# Patient Record
Sex: FEMALE | Race: WHITE | URBAN - METROPOLITAN AREA
[De-identification: names, ages, dates, MRNs, and addresses within clinical notes are randomized per-mention and may not be internally consistent; named-entity substitution may affect disease eponyms.]

---

## 2017-03-28 ENCOUNTER — OP HISTORICAL/CONVERTED ENCOUNTER (OUTPATIENT)
Dept: OTHER | Age: 57
End: 2017-03-28

## 2018-07-07 LAB
CREATININE, EXTERNAL: 1.58
LDL-C, EXTERNAL: 99

## 2018-07-20 ENCOUNTER — OP HISTORICAL/CONVERTED ENCOUNTER (OUTPATIENT)
Dept: OTHER | Age: 58
End: 2018-07-20

## 2018-08-28 ENCOUNTER — OP HISTORICAL/CONVERTED ENCOUNTER (OUTPATIENT)
Dept: OTHER | Age: 58
End: 2018-08-28

## 2018-10-15 ENCOUNTER — OFFICE VISIT (OUTPATIENT)
Dept: ENDOCRINOLOGY | Age: 58
End: 2018-10-15

## 2018-10-15 VITALS
WEIGHT: 128 LBS | DIASTOLIC BLOOD PRESSURE: 71 MMHG | TEMPERATURE: 98 F | HEIGHT: 62 IN | SYSTOLIC BLOOD PRESSURE: 169 MMHG | HEART RATE: 64 BPM | OXYGEN SATURATION: 100 % | RESPIRATION RATE: 14 BRPM | BODY MASS INDEX: 23.55 KG/M2

## 2018-10-15 DIAGNOSIS — E89.0 POSTABLATIVE HYPOTHYROIDISM: Primary | ICD-10-CM

## 2018-10-15 RX ORDER — TAMOXIFEN CITRATE 20 MG/1
20 TABLET ORAL DAILY
COMMUNITY
Start: 2018-09-09

## 2018-10-15 RX ORDER — ARIPIPRAZOLE 5 MG/1
5 TABLET ORAL 2 TIMES DAILY
COMMUNITY
Start: 2018-04-01

## 2018-10-15 RX ORDER — FUROSEMIDE 40 MG/1
40 TABLET ORAL DAILY
COMMUNITY
Start: 2018-09-10

## 2018-10-15 RX ORDER — ATORVASTATIN CALCIUM 40 MG/1
40 TABLET, FILM COATED ORAL DAILY
COMMUNITY
Start: 2018-04-01

## 2018-10-15 RX ORDER — CLOPIDOGREL BISULFATE 75 MG/1
TABLET ORAL
COMMUNITY

## 2018-10-15 RX ORDER — POTASSIUM CHLORIDE 750 MG/1
10 TABLET, EXTENDED RELEASE ORAL 2 TIMES DAILY
COMMUNITY
Start: 2018-04-01

## 2018-10-15 RX ORDER — GUAIFENESIN 100 MG/5ML
81 LIQUID (ML) ORAL DAILY
COMMUNITY
Start: 2018-04-01

## 2018-10-15 RX ORDER — LEVOTHYROXINE SODIUM 200 UG/1
200 TABLET ORAL
COMMUNITY
Start: 2018-09-10

## 2018-10-15 NOTE — PROGRESS NOTES
[facility]               Jeanette Schumacher MD        [address] 78 [phone] Fax [phone]             Patient Information  Date:10/15/2018  Name : Santino Melendez 62 y.o.     YOB: 1960         Referred by: Chavo Hernandez MD         History of present illness    Santino Melendez is a 62 y.o. female  here for evaluation of thyroid and fluctuating thyroid function test  She is a status post radioactive iodine ablation 20 years ago and is on replacement levothyroxine. TSH has been fluctuating, reports compliance  He is on levothyroxine generic, was on Synthroid in the past  Due to the expense does not want to go on brand name  She is also taking the medication with juice, reportedly had a lot of medical issues this year including heart attack, colon surgery which is overwhelming her  No gastric bypass  She is on levothyroxine 200 mcg, TSH in January was 99 and a week later it was normal, in July 2018 TSH was 19   She is not on biotin        No FH of thyroid disease. No FH of thyroid cancer     Wt Readings from Last 3 Encounters:   10/15/18 128 lb (58.1 kg)       Past Medical History:   Diagnosis Date    Arthritis     Breast cancer ([facility] [address] 2014    Bronchitis     CHF (congestive heart failure) (HCC)     Depression     Graves disease     Heart attack ([facility] 03/2018    High blood pressure     Hyperlipidemia        Current Outpatient Prescriptions   Medication Sig    atorvastatin (LIPITOR) 40 mg tablet Take 40 mg by mouth daily.  potassium chloride (KLOR-CON) 10 mEq tablet Take 10 mEq by mouth two (2) times a day.  ARIPiprazole (ABILIFY) 5 mg tablet Take 5 mg by mouth two (2) times a day.  tamoxifen (NOLVADEX) 20 mg tablet Take 20 mg by mouth daily.  furosemide (LASIX) 40 mg tablet Take 40 mg by mouth daily.  levothyroxine (SYNTHROID) 200 mcg tablet Take 200 mcg by mouth Daily (before breakfast).     aspirin 81 mg chewable tablet Take 81 mg by mouth daily.  clopidogrel (PLAVIX) 75 mg tab Take  by mouth. No current facility-administered medications for this visit. No Known Allergies      Review of Systems:  All 10 systems  reviewed and are negative other than mentioned in HPI    Physical Examination:  Blood pressure 169/71, pulse 64, temperature 98 °F (36.7 °C), temperature source Oral, resp. rate 14, height 5' 2\" (1.575 m), weight 128 lb (58.1 kg), SpO2 100 %. - General: pleasant, no distress, good eye contact  - HEENT: no exopthalmos, no periorbital edema, no scleral/conjunctival injection, EOMI, no lid lag or stare  - Neck: supple, no thyromegaly, no nodules,no lymphadenopathy  - Cardiovascular: regular, normal rate, normal S1 and S2,  - Respiratory: clear to auscultation bilaterally  - Gastrointestinal: soft, nontender, nondistended, BS +  - Musculoskeletal: no proximal muscle weakness in upper or lower extremities  - Integumentary: no tremors, no edema,  - Neurological:alert and oriented   - Psychiatric: normal mood and affect    Data Reviewed:     [] Reviewed labs      Assessment/Plan:     1. Postablative hypothyroidism        Post ablative hypothyroidism   Discussed the natural course of hypothyroidism and instructions for levothyroxine,compliance to keep the levels stable. Discussed about the various factors which can affect TSH including medications,weight change,illness, compliance and instructions to take LT4 by itself to keep the levels stable. Fluctuating TSH, she is on a higher dose, reports compliance  Malabsorption versus TSH assay interference  Due to the expense she did not want to change to brand Synthroid. She wants to take it consistently according to the instructions with water only and monitor the blood test in 6 weeks, if abnormal then agreed to switch to brand name. Also overwhelmed with multiple doctors visits  she did not want to come here for the labs, wants to get it at PCPs office.   Explained the risks of uncontrolled hypothyroidism                  There are no Patient Instructions on file for this visit. Follow-up Disposition:  Return if symptoms worsen or fail to improve. Patient /caregiver verbalized understanding   Voice-recognition software was used to generate this report, which may result in some phonetic-based errors in the grammar and contents. Even though attempts were made to correct all the mistakes, some may have been missed and remained in the body of the report.

## 2018-10-15 NOTE — MR AVS SNAPSHOT
[facility] [address] 
[phone] Patient: Sol Mac MRN: [id number] ERT:2/40/[birth year]805 Visit Information Date & Time Provider Department Dept. Phone Encounter #  
 10/15/2018 11:00 AM Magno Yuan MD [facility] [place] [facility] [phone] [id number] Follow-up Instructions Return if symptoms worsen or fail to improve. Upcoming Health Maintenance Date Due Hepatitis C Screening 1960 PAP AKA CERVICAL CYTOLOGY 7/22/1981 Shingrix Vaccine Age 50> (1 of 2) 7/22/2010 BREAST CANCER SCRN MAMMOGRAM 7/22/2010 FOBT Q 1 YEAR AGE 50-75 7/22/2010 MEDICARE YEARLY EXAM 3/14/2018 DTaP/Tdap/Td series (2 - Td) 10/1/2027 Allergies as of 10/15/2018  Review Complete On: 10/15/2018 By: Magno Yuan MD  
 No Known Allergies Current Immunizations  Never Reviewed Name Date Influenza Vaccine 9/29/2018 Not reviewed this visit You Were Diagnosed With   
  
 Codes Comments Postablative hypothyroidism    -  Primary ICD-10-CM: E89.0 ICD-9-CM: 244.1 Vitals BP Pulse Temp Resp Height(growth percentile) Weight(growth percentile) 169/71 (BP 1 Location: Right arm, BP Patient Position: Sitting) 64 98 °F (36.7 °C) (Oral) 14 5' 2\" (1.575 m) 128 lb (58.1 kg) SpO2 BMI OB Status Smoking Status 100% 23.41 kg/m2 Hysterectomy Former Smoker Vitals History BMI and BSA Data Body Mass Index Body Surface Area  
 23.41 kg/m 2 1.59 m 2 Preferred Pharmacy Pharmacy Name Phone [facility] [place] [address] [phone] Your Updated Medication List  
  
   
This list is accurate as of 10/15/18 11:20 AM.  Always use your most recent med list.  
  
  
  
  
 ARIPiprazole 5 mg tablet Commonly known as:  ABILIFY Take 5 mg by mouth two (2) times a day. aspirin 81 mg chewable tablet Take 81 mg by mouth daily. atorvastatin 40 mg tablet Commonly known as:  LIPITOR Take 40 mg by mouth daily. furosemide 40 mg tablet Commonly known as:  LASIX Take 40 mg by mouth daily. levothyroxine 200 mcg tablet Commonly known as:  SYNTHROID Take 200 mcg by mouth Daily (before breakfast). PLAVIX 75 mg Tab Generic drug:  clopidogrel Take  by mouth.  
  
 potassium chloride 10 mEq tablet Commonly known as:  KLOR-CON Take 10 mEq by mouth two (2) times a day. tamoxifen 20 mg tablet Commonly known as:  NOLVADEX Take 20 mg by mouth daily. Follow-up Instructions Return if symptoms worsen or fail to improve. Introducing [facility]! [employer] introduces [employer] patient portal. Now you can access parts of your medical record, email your doctor's office, and request medication refills online. 1. In your internet browser, go to [email or web address] 2. Click on the First Time User? Click Here link in the Sign In box. You will see the New Member Sign Up page. 3. Enter your [employer] Access Code exactly as it appears below. You will not need to use this code after youve completed the sign-up process. If you do not sign up before the expiration date, you must request a new code. · [employer] Access Code: [email or web address] Expires: 1/13/2019 11:20 AM 
 
4. Enter the last four digits of your Social Security Number (xxxx) and Date of Birth (mm/dd/yyyy) as indicated and click Submit. You will be taken to the next sign-up page. 5. Create a [employer] ID. This will be your [employer] login ID and cannot be changed, so think of one that is secure and easy to remember. 6. Create a [employer] password. You can change your password at any time. 7. Enter your Password Reset Question and Answer. This can be used at a later time if you forget your password. 8. Enter your e-mail address.  You will receive e-mail notification when new information is available in [employer]. 9. Click Sign Up. You can now view and download portions of your medical record. 10. Click the Download Summary menu link to download a portable copy of your medical information. If you have questions, please visit the Frequently Asked Questions section of the [employer] website. Remember, [employer] is NOT to be used for urgent needs. For medical emergencies, dial 911. Now available from your iPhone and Android! Please provide this summary of care documentation to your next provider. Your primary care clinician is listed as Kelly Solorzano. If you have any questions after today's visit, please call [phone].

## 2018-11-06 ENCOUNTER — OP HISTORICAL/CONVERTED ENCOUNTER (OUTPATIENT)
Dept: OTHER | Age: 58
End: 2018-11-06

## 2018-11-17 ENCOUNTER — OP HISTORICAL/CONVERTED ENCOUNTER (OUTPATIENT)
Dept: OTHER | Age: 58
End: 2018-11-17

## 2018-11-26 ENCOUNTER — OP HISTORICAL/CONVERTED ENCOUNTER (OUTPATIENT)
Dept: OTHER | Age: 58
End: 2018-11-26

## 2018-12-11 ENCOUNTER — OP HISTORICAL/CONVERTED ENCOUNTER (OUTPATIENT)
Dept: OTHER | Age: 58
End: 2018-12-11

## 2018-12-17 ENCOUNTER — OP HISTORICAL/CONVERTED ENCOUNTER (OUTPATIENT)
Dept: OTHER | Age: 58
End: 2018-12-17

## 2022-03-19 PROBLEM — E89.0 POSTABLATIVE HYPOTHYROIDISM: Status: ACTIVE | Noted: 2018-10-15

## 2023-05-25 RX ORDER — LEVOTHYROXINE SODIUM 0.2 MG/1
200 TABLET ORAL
COMMUNITY
Start: 2018-09-10

## 2023-05-25 RX ORDER — ARIPIPRAZOLE 5 MG/1
5 TABLET ORAL 2 TIMES DAILY
COMMUNITY
Start: 2018-04-01

## 2023-05-25 RX ORDER — ASPIRIN 81 MG/1
81 TABLET, CHEWABLE ORAL DAILY
COMMUNITY
Start: 2018-04-01

## 2023-05-25 RX ORDER — ATORVASTATIN CALCIUM 40 MG/1
40 TABLET, FILM COATED ORAL DAILY
COMMUNITY
Start: 2018-04-01

## 2023-05-25 RX ORDER — TAMOXIFEN CITRATE 20 MG/1
20 TABLET ORAL DAILY
COMMUNITY
Start: 2018-09-09

## 2023-05-25 RX ORDER — CLOPIDOGREL BISULFATE 75 MG/1
TABLET ORAL
COMMUNITY

## 2023-05-25 RX ORDER — POTASSIUM CHLORIDE 750 MG/1
10 TABLET, EXTENDED RELEASE ORAL 2 TIMES DAILY
COMMUNITY
Start: 2018-04-01

## 2023-05-25 RX ORDER — FUROSEMIDE 40 MG/1
40 TABLET ORAL DAILY
COMMUNITY
Start: 2018-09-10